# Patient Record
Sex: MALE | NOT HISPANIC OR LATINO | URBAN - METROPOLITAN AREA
[De-identification: names, ages, dates, MRNs, and addresses within clinical notes are randomized per-mention and may not be internally consistent; named-entity substitution may affect disease eponyms.]

---

## 2020-08-31 ENCOUNTER — IMPORTED ENCOUNTER (OUTPATIENT)
Dept: URBAN - METROPOLITAN AREA CLINIC 1 | Facility: CLINIC | Age: 55
End: 2020-08-31

## 2020-08-31 PROBLEM — H25.813: Noted: 2020-08-31

## 2020-08-31 PROCEDURE — 92015 DETERMINE REFRACTIVE STATE: CPT

## 2020-08-31 PROCEDURE — 92004 COMPRE OPH EXAM NEW PT 1/>: CPT

## 2020-08-31 NOTE — PATIENT DISCUSSION
1.  Cataracts OU -- Observe for now without intervention. The patient was advised to contact us if any change or worsening of visionMRX for glasses given. Letter to PCP. Return for an appointment in 1 year 27 with Dr. Joni Schaumann.

## 2022-04-08 ASSESSMENT — TONOMETRY
OD_IOP_MMHG: 14
OS_IOP_MMHG: 14

## 2022-04-08 ASSESSMENT — VISUAL ACUITY
OD_CC: 20/40
OS_CC: 20/30

## 2024-06-21 ENCOUNTER — CARE COORDINATION (OUTPATIENT)
Dept: OTHER | Facility: CLINIC | Age: 59
End: 2024-06-21

## 2024-06-21 NOTE — CARE COORDINATION
Ambulatory Care Coordination Note     6/21/2024 2:02 PM     Patient outreach attempt by this ACM today to offer care management services. ACM was unable to reach the patient by telephone today; left voice message requesting a return phone call to this ACM.  Logic[employer] message sent requesting patient to contact this ACM.     ACM: Estrella Shafer RN       Follow Up:   Plan for next ACM outreach in approximately 1-2 days  to complete:  - outreach attempt to offer care management services.

## 2024-06-24 ENCOUNTER — CARE COORDINATION (OUTPATIENT)
Dept: OTHER | Facility: CLINIC | Age: 59
End: 2024-06-24

## 2024-06-24 NOTE — CARE COORDINATION
Ambulatory Care Coordination Note     2024 9:34 AM     Patient Current Location:  [place]     This patient was received as a referral from [facility] .    ACM contacted the patient by telephone. Verified name and  with patient as identifiers. Provided introduction to self, and explanation of the ACM role.   Patient declined care management services at this time.          ACM: Estrella Shafer RN     Care Summary Note: ACM outreached patient at this time to offer care management services. ACM gave explanation of role. Patient offered no questions and stated he does not have any care management needs at this time. ACM will sign off.       Follow Up:   No further Ambulatory Care Management follow-up scheduled at this time.  patient  has Ambulatory Care Manager's contact information for any further questions, concerns or needs.